# Patient Record
Sex: MALE | Race: BLACK OR AFRICAN AMERICAN | NOT HISPANIC OR LATINO | URBAN - METROPOLITAN AREA
[De-identification: names, ages, dates, MRNs, and addresses within clinical notes are randomized per-mention and may not be internally consistent; named-entity substitution may affect disease eponyms.]

---

## 2020-08-20 ENCOUNTER — EMERGENCY (EMERGENCY)
Facility: HOSPITAL | Age: 40
LOS: 1 days | Discharge: HOME | End: 2020-08-20
Attending: STUDENT IN AN ORGANIZED HEALTH CARE EDUCATION/TRAINING PROGRAM | Admitting: STUDENT IN AN ORGANIZED HEALTH CARE EDUCATION/TRAINING PROGRAM
Payer: COMMERCIAL

## 2020-08-20 VITALS
SYSTOLIC BLOOD PRESSURE: 120 MMHG | TEMPERATURE: 98 F | RESPIRATION RATE: 17 BRPM | OXYGEN SATURATION: 100 % | HEART RATE: 60 BPM | DIASTOLIC BLOOD PRESSURE: 88 MMHG

## 2020-08-20 VITALS
OXYGEN SATURATION: 100 % | DIASTOLIC BLOOD PRESSURE: 72 MMHG | TEMPERATURE: 98 F | SYSTOLIC BLOOD PRESSURE: 128 MMHG | RESPIRATION RATE: 18 BRPM | HEART RATE: 78 BPM

## 2020-08-20 PROCEDURE — 99284 EMERGENCY DEPT VISIT MOD MDM: CPT

## 2020-08-20 PROCEDURE — 72074 X-RAY EXAM THORAC SPINE4/>VW: CPT | Mod: 26

## 2020-08-20 PROCEDURE — 72125 CT NECK SPINE W/O DYE: CPT | Mod: 26

## 2020-08-20 RX ORDER — LIDOCAINE 4 G/100G
1 CREAM TOPICAL ONCE
Refills: 0 | Status: COMPLETED | OUTPATIENT
Start: 2020-08-20 | End: 2020-08-20

## 2020-08-20 RX ORDER — IBUPROFEN 200 MG
600 TABLET ORAL ONCE
Refills: 0 | Status: COMPLETED | OUTPATIENT
Start: 2020-08-20 | End: 2020-08-20

## 2020-08-20 RX ORDER — CYCLOBENZAPRINE HYDROCHLORIDE 10 MG/1
10 TABLET, FILM COATED ORAL ONCE
Refills: 0 | Status: COMPLETED | OUTPATIENT
Start: 2020-08-20 | End: 2020-08-20

## 2020-08-20 RX ADMIN — LIDOCAINE 1 PATCH: 4 CREAM TOPICAL at 18:58

## 2020-08-20 RX ADMIN — CYCLOBENZAPRINE HYDROCHLORIDE 10 MILLIGRAM(S): 10 TABLET, FILM COATED ORAL at 18:58

## 2020-08-20 RX ADMIN — Medication 600 MILLIGRAM(S): at 18:58

## 2020-08-20 RX ADMIN — Medication 600 MILLIGRAM(S): at 19:13

## 2020-08-20 NOTE — ED PROVIDER NOTE - PATIENT PORTAL LINK FT
You can access the FollowMyHealth Patient Portal offered by [facility] by registering at the following website: [email or web address]. By joining [facility] FollowMyHealth portal, you will also be able to view your health information using other applications (apps) compatible with our system.
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
## 2020-08-20 NOTE — ED PROVIDER NOTE - NSFOLLOWUPINSTRUCTIONS_ED_ALL_ED_FT
1. TAKE ALL MEDICATIONS AS DIRECTED.    2. FOR PAIN OR FEVER YOU CAN TAKE IBUPROFEN (MOTRIN, ADVIL) OR ACETAMINOPHEN (TYLENOL) AS NEEDED, AS DIRECTED ON PACKAGING.  3. FOLLOW UP WITH YOUR PRIMARY DOCTOR WITHIN 5 DAYS AS DIRECTED.  4. IF YOU HAD LABS OR IMAGING DONE, YOU WERE GIVEN COPIES OF ALL LABS AND/OR IMAGING RESULTS FROM YOUR ER VISIT--PLEASE TAKE THEM WITH YOU TO YOUR FOLLOW UP APPOINTMENTS.  5. IF NEEDED, CALL PATIENT ACCESS SERVICES AT [phone] TO FIND A PRIMARY CARE PHYSICIAN.  OR CALL [phone] TO MAKE AN APPOINTMENT WITH THE CLINIC.  6. RETURN TO THE ER FOR ANY WORSENING SYMPTOMS OR CONCERNS.

## 2020-08-20 NOTE — ED PROVIDER NOTE - PHYSICAL EXAMINATION
Gen: Well appearing in NAD  Head: NC/AT  Neck: trachea midline  cv: rrr  Resp:  No distress, lungs clear  neck - c8 tendernss, right paraspinal tenderness and spasm   Ext: no deformities  Neuro:  A&O appears non focal, normnal motor and sensory exam  Skin:  Warm and dry as visualized  Psych:  Normal affect and mood
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
## 2020-08-20 NOTE — ED ADULT NURSE NOTE - OBJECTIVE STATEMENT
received pt from day RN A&OX4, ambulatory. pt received in c collar. respirations even and unlabored, chest rise equal b/l. pt d/c.

## 2020-08-20 NOTE — ED PROVIDER NOTE - PROGRESS NOTE DETAILS
49 ct with no acute findings, findings consistent with pts known history of TBI, pain improved, willd c

## 2022-04-14 PROBLEM — Z00.00 ENCOUNTER FOR PREVENTIVE HEALTH EXAMINATION: Status: ACTIVE | Noted: 2022-04-14

## 2022-05-03 ENCOUNTER — APPOINTMENT (OUTPATIENT)
Dept: ORTHOPEDIC SURGERY | Facility: CLINIC | Age: 42
End: 2022-05-03
Payer: COMMERCIAL

## 2022-05-03 DIAGNOSIS — Z78.9 OTHER SPECIFIED HEALTH STATUS: ICD-10-CM

## 2022-05-03 PROCEDURE — 20610 DRAIN/INJ JOINT/BURSA W/O US: CPT

## 2022-05-03 PROCEDURE — 99213 OFFICE O/P EST LOW 20 MIN: CPT | Mod: 25

## 2022-05-03 NOTE — ASSESSMENT
[FreeTextEntry1] : Xrays reviewed from [facility] negative for B/L knees. \par +mechanical complaints.\par Obtain MRI of the left knee to r/o LMT. \par RTO after MRI\par \par Impression: left knee\par 1. Findings consistent with undersurface tearing involving the middle third of the posterior horn of the medial meniscus.\par 2. Mild chronic ACL sprain with ganglion.\par 3. Slight MCL laxity.\par 4. Extensor mechanism tendinopathy.\par 5. Tricompartmental chondral loss, joint space narrowing, and small osteophytes with moderate subchondral edema in the lateral femoral trochlea, moderate patellofemoral effusion and moderate infrapatellar synovitis laterally with a small popliteal cyst and mild pes anserine tenosynovitis.\par 6. No acute fracture or lateral meniscal tear.\par E-signed by Tyrell Gonzalez MD 02/04/2022\par I reviewed and agree with the results.\par \par Discussed MRI finding and pathology of the knee, MRI reveals PF OA with subchondral edema in lateral femoral trochlea, global edema, and MMT not recommended for surgical intervention. \par PF tenderness greater than mMJL\par Discussed recommendations moving forward, prescribed MDP to further reduce effusion/pain. \par Obtain auth for visco injection for the left knee.\par Patient received same day auth for euflexxa, recommended finishing the MDP to reduce effusion, If he has no pain afterwards f/u as needed, if pain persist f/u to begin injections\par \par 5/3/22: f/u on the left knee. \par Elected to commence visco injections for the left knee. \par RTO 1 wk.

## 2022-05-03 NOTE — PHYSICAL EXAM
[Left] : left knee [5___] : quadriceps 5[unfilled]/5 [] : patient ambulates without assistive device [TWNoteComboBox7] : flexion 135 degrees [de-identified] : extension 0 degrees

## 2022-05-03 NOTE — REASON FOR VISIT
[FreeTextEntry2] : 5/3/22 Follow up for left knee, Euflexxa Auth on file, pain getting worse , did PT only once due to work schedule

## 2022-05-03 NOTE — HISTORY OF PRESENT ILLNESS
[Result of repetitive motion] : result of repetitive motion [8] : 8 [7] : 7 [Dull/Aching] : dull/aching [Constant] : constant [Rest] : rest [Standing] : standing [Walking] : walking [Stairs] : stairs [de-identified] : 02/01/2022: 40YO male patient present for B/L knees, denies trauma, going c/o of cracking and snapping sensation, left symptoms worse than right. c/o of clicking in the knee.\par \par  2/15/22: MRI review left knee [] : no [de-identified] : PT

## 2022-05-03 NOTE — DISCUSSION/SUMMARY
[de-identified] : The documentation recorded by the scribe accurately reflects the service I personally performed and the decisions made by me.\par I, Virgen Lakhani, attest that this documentation has been prepared under the direction and in the presence of Provider Reinaldo Cardenas MD.\par \par The patient was seen by Reinaldo Cardenas MD\par

## 2022-05-03 NOTE — PROCEDURE
[Large Joint Injection] : Large joint injection [Left] : of the left [Knee] : knee [Pain] : pain [Inflammation] : inflammation [X-ray evidence of Osteoarthritis on this or prior visit] : x-ray evidence of Osteoarthritis on this or prior visit [Alcohol] : alcohol [Betadine] : betadine [Ethyl Chloride sprayed topically] : ethyl chloride sprayed topically [Sterile technique used] : sterile technique used [] : Patient tolerated procedure well [Call if redness, pain or fever occur] : call if redness, pain or fever occur [Apply ice for 15min out of every hour for the next 12-24 hours as tolerated] : apply ice for 15 minutes out of every hour for the next 12-24 hours as tolerated [Patient was advised to rest the joint(s) for ____ days] : patient was advised to rest the joint(s) for [unfilled] days [Patient had decreased mobility in the joint] : patient had decreased mobility in the joint [Risks, benefits, alternatives discussed / Verbal consent obtained] : the risks benefits, and alternatives have been discussed, and verbal consent was obtained

## 2022-05-12 ENCOUNTER — APPOINTMENT (OUTPATIENT)
Dept: ORTHOPEDIC SURGERY | Facility: CLINIC | Age: 42
End: 2022-05-12
Payer: COMMERCIAL

## 2022-05-12 PROCEDURE — 99212 OFFICE O/P EST SF 10 MIN: CPT | Mod: 25

## 2022-05-12 PROCEDURE — 20610 DRAIN/INJ JOINT/BURSA W/O US: CPT

## 2022-05-12 NOTE — PHYSICAL EXAM
[Left] : left knee [5___] : quadriceps 5[unfilled]/5 [] : no erythema [TWNoteComboBox7] : flexion 135 degrees [de-identified] : extension 0 degrees

## 2022-05-12 NOTE — HISTORY OF PRESENT ILLNESS
[Result of repetitive motion] : result of repetitive motion [8] : 8 [7] : 7 [Dull/Aching] : dull/aching [Constant] : constant [Rest] : rest [Standing] : standing [Walking] : walking [Stairs] : stairs [2] : 2 [Euflexxa] : Euflexxa [] : no [de-identified] : PT [de-identified] : 5/3/22 [de-identified] : left knee [de-identified] : none [TWNoteComboBox1] : 30%

## 2022-05-12 NOTE — PROCEDURE
[Large Joint Injection] : Large joint injection [Left] : of the left [Knee] : knee [Pain] : pain [Inflammation] : inflammation [X-ray evidence of Osteoarthritis on this or prior visit] : x-ray evidence of Osteoarthritis on this or prior visit [Alcohol] : alcohol [Betadine] : betadine [Ethyl Chloride sprayed topically] : ethyl chloride sprayed topically [Sterile technique used] : sterile technique used [] : Patient tolerated procedure well [Call if redness, pain or fever occur] : call if redness, pain or fever occur [Apply ice for 15min out of every hour for the next 12-24 hours as tolerated] : apply ice for 15 minutes out of every hour for the next 12-24 hours as tolerated [Patient was advised to rest the joint(s) for ____ days] : patient was advised to rest the joint(s) for [unfilled] days [Patient had decreased mobility in the joint] : patient had decreased mobility in the joint [Risks, benefits, alternatives discussed / Verbal consent obtained] : the risks benefits, and alternatives have been discussed, and verbal consent was obtained [Euflexxa] : Euflexxa [#2] : series #2

## 2022-05-12 NOTE — ASSESSMENT
[FreeTextEntry1] : Xrays reviewed from [facility] negative for B/L knees. \par +mechanical complaints.\par Obtain MRI of the left knee to r/o LMT. \par RTO after MRI\par \par Impression: left knee\par 1. Findings consistent with undersurface tearing involving the middle third of the posterior horn of the medial meniscus.\par 2. Mild chronic ACL sprain with ganglion.\par 3. Slight MCL laxity.\par 4. Extensor mechanism tendinopathy.\par 5. Tricompartmental chondral loss, joint space narrowing, and small osteophytes with moderate subchondral edema in the lateral femoral trochlea, moderate patellofemoral effusion and moderate infrapatellar synovitis laterally with a small popliteal cyst and mild pes anserine tenosynovitis.\par 6. No acute fracture or lateral meniscal tear.\par E-signed by Tyrell Gonzalez MD 02/04/2022\par I reviewed and agree with the results.\par \par Discussed MRI finding and pathology of the knee, MRI reveals PF OA with subchondral edema in lateral femoral trochlea, global edema, and MMT not recommended for surgical intervention. \par PF tenderness greater than mMJL\par Discussed recommendations moving forward, prescribed MDP to further reduce effusion/pain. \par Obtain auth for visco injection for the left knee.\par Patient received same day auth for euflexxa, recommended finishing the MDP to reduce effusion, If he has no pain afterwards f/u as needed, if pain persist f/u to begin injections\par \par 5/3/22: f/u on the left knee. \par Elected to commence visco injections for the left knee. \par \par 5/12/22: Euflexxa #2 left knee tolerated well.\par RTO 1 wk.

## 2022-05-12 NOTE — DISCUSSION/SUMMARY
[de-identified] : Progress note completed by HOA Amaya.\par \par Physician Instructions:\par The documentation recorded by the PA accurately reflects the service I personally performed and decisions made by me.\par \par The documentation recorded by the scribe accurately reflects the service I personally performed and the decisions made by me.\par I, Virgen Lakhani, attest that this documentation has been prepared under the direction and in the presence of Provider Reinaldo Cardenas MD.\par \par

## 2022-05-19 ENCOUNTER — APPOINTMENT (OUTPATIENT)
Dept: ORTHOPEDIC SURGERY | Facility: CLINIC | Age: 42
End: 2022-05-19
Payer: COMMERCIAL

## 2022-05-19 PROCEDURE — 99212 OFFICE O/P EST SF 10 MIN: CPT | Mod: 25

## 2022-05-19 PROCEDURE — 20610 DRAIN/INJ JOINT/BURSA W/O US: CPT | Mod: LT

## 2022-05-19 NOTE — DISCUSSION/SUMMARY
[de-identified] : Progress note completed by HOA Espinoza.\par \par Physician Instructions:\par The documentation recorded by the PA accurately reflects the service I personally performed and decisions made by me.\par Reinaldo Cardenas MD.\par \par

## 2022-05-19 NOTE — PHYSICAL EXAM
[Left] : left knee [5___] : quadriceps 5[unfilled]/5 [] : no erythema [TWNoteComboBox7] : flexion 135 degrees [de-identified] : extension 0 degrees

## 2022-05-19 NOTE — HISTORY OF PRESENT ILLNESS
[Result of repetitive motion] : result of repetitive motion [8] : 8 [7] : 7 [Dull/Aching] : dull/aching [Constant] : constant [Rest] : rest [Standing] : standing [Walking] : walking [Stairs] : stairs [2] : 2 [Euflexxa] : Euflexxa [] : no [de-identified] : PT [de-identified] : 5/3/22 [de-identified] : left knee [de-identified] : none [TWNoteComboBox1] : 30%

## 2022-05-19 NOTE — PROCEDURE
[Large Joint Injection] : Large joint injection [Left] : of the left [Knee] : knee [Pain] : pain [Inflammation] : inflammation [X-ray evidence of Osteoarthritis on this or prior visit] : x-ray evidence of Osteoarthritis on this or prior visit [Alcohol] : alcohol [Betadine] : betadine [Ethyl Chloride sprayed topically] : ethyl chloride sprayed topically [Sterile technique used] : sterile technique used [Euflexxa] : Euflexxa [] : Patient tolerated procedure well [Call if redness, pain or fever occur] : call if redness, pain or fever occur [Apply ice for 15min out of every hour for the next 12-24 hours as tolerated] : apply ice for 15 minutes out of every hour for the next 12-24 hours as tolerated [Patient was advised to rest the joint(s) for ____ days] : patient was advised to rest the joint(s) for [unfilled] days [Patient had decreased mobility in the joint] : patient had decreased mobility in the joint [Risks, benefits, alternatives discussed / Verbal consent obtained] : the risks benefits, and alternatives have been discussed, and verbal consent was obtained [#3] : series #3

## 2022-07-07 ENCOUNTER — APPOINTMENT (OUTPATIENT)
Dept: ORTHOPEDIC SURGERY | Facility: CLINIC | Age: 42
End: 2022-07-07

## 2022-07-07 VITALS — BODY MASS INDEX: 22.53 KG/M2 | HEIGHT: 73 IN | WEIGHT: 170 LBS

## 2022-07-07 DIAGNOSIS — M17.12 UNILATERAL PRIMARY OSTEOARTHRITIS, LEFT KNEE: ICD-10-CM

## 2022-07-07 PROCEDURE — 99213 OFFICE O/P EST LOW 20 MIN: CPT

## 2022-07-07 NOTE — ASSESSMENT
[FreeTextEntry1] : Xrays reviewed from [facility] negative for B/L knees. \par +mechanical complaints.\par Obtain MRI of the left knee to r/o LMT. \par RTO after MRI\par \par Impression: left knee\par 1. Findings consistent with undersurface tearing involving the middle third of the posterior horn of the medial meniscus.\par 2. Mild chronic ACL sprain with ganglion.\par 3. Slight MCL laxity.\par 4. Extensor mechanism tendinopathy.\par 5. Tricompartmental chondral loss, joint space narrowing, and small osteophytes with moderate subchondral edema in the lateral femoral trochlea, moderate patellofemoral effusion and moderate infrapatellar synovitis laterally with a small popliteal cyst and mild pes anserine tenosynovitis.\par 6. No acute fracture or lateral meniscal tear.\par E-signed by Tyrell Gonzalez MD 02/04/2022\par I reviewed and agree with the results.\par \par Discussed MRI finding and pathology of the knee, MRI reveals PF OA with subchondral edema in lateral femoral trochlea, global edema, and MMT not recommended for surgical intervention. \par PF tenderness greater than mMJL\par Discussed recommendations moving forward, prescribed MDP to further reduce effusion/pain. \par Obtain auth for visco injection for the left knee.\par Patient received same day auth for euflexxa, recommended finishing the MDP to reduce effusion, If he has no pain afterwards f/u as needed, if pain persist f/u to begin injections\par \par 5/3/22: f/u on the left knee. \par Elected to commence visco injections for the left knee. \par \par 5/12/22: Euflexxa #2 left knee tolerated well.\par 05/19/22: 3rd euflexxa\par RTO 1 wk. \par \par 7/7/22: 50% improvement overall with visco injections. \par he will RTO prn. Timing to repeat discussed.

## 2022-07-07 NOTE — PHYSICAL EXAM
[Left] : left knee [5___] : quadriceps 5[unfilled]/5 [] : no erythema [TWNoteComboBox7] : flexion 135 degrees [de-identified] : extension 0 degrees

## 2022-07-07 NOTE — HISTORY OF PRESENT ILLNESS
[Left Leg] : left leg [Burning] : burning [Dull/Aching] : dull/aching [Radiating] : radiating [Leisure] : leisure [Rest] : rest [Result of repetitive motion] : result of repetitive motion [8] : 8 [7] : 7 [Constant] : constant [Standing] : standing [Walking] : walking [Stairs] : stairs [2] : 2 [Euflexxa] : Euflexxa [] : Post Surgical Visit: no [FreeTextEntry1] : left knee [FreeTextEntry5] : N [FreeTextEntry7] : down the leg [de-identified] : activity [de-identified] : 5/19/22 [de-identified] : Dr.Price [de-identified] : PT [de-identified] : xr [de-identified] : 5/3/22 [de-identified] : left knee [de-identified] : none [TWNoteComboBox1] : 30%

## 2022-07-07 NOTE — DISCUSSION/SUMMARY
[de-identified] : The documentation recorded by the scribe accurately reflects the service I personally performed and the decisions made by me.\par I, Virgen Lakhani, attest that this documentation has been prepared under the direction and in the presence of Provider Reinaldo Cardenas MD.\par \par The patient was seen by Reinaldo Cardenas MD\par

## 2023-04-10 ENCOUNTER — APPOINTMENT (OUTPATIENT)
Dept: OPHTHALMOLOGY | Facility: CLINIC | Age: 43
End: 2023-04-10